# Patient Record
Sex: FEMALE | Race: OTHER
[De-identification: names, ages, dates, MRNs, and addresses within clinical notes are randomized per-mention and may not be internally consistent; named-entity substitution may affect disease eponyms.]

---

## 2019-12-04 ENCOUNTER — HOSPITAL ENCOUNTER (OUTPATIENT)
Dept: HOSPITAL 47 - ORWHC2ENDO | Age: 69
Discharge: HOME | End: 2019-12-04
Attending: INTERNAL MEDICINE
Payer: MEDICARE

## 2019-12-04 VITALS — RESPIRATION RATE: 17 BRPM | HEART RATE: 75 BPM

## 2019-12-04 VITALS — BODY MASS INDEX: 34.7 KG/M2

## 2019-12-04 VITALS — SYSTOLIC BLOOD PRESSURE: 122 MMHG | DIASTOLIC BLOOD PRESSURE: 57 MMHG

## 2019-12-04 VITALS — TEMPERATURE: 97.9 F

## 2019-12-04 DIAGNOSIS — Z98.890: ICD-10-CM

## 2019-12-04 DIAGNOSIS — K57.30: ICD-10-CM

## 2019-12-04 DIAGNOSIS — Z91.040: ICD-10-CM

## 2019-12-04 DIAGNOSIS — M06.9: ICD-10-CM

## 2019-12-04 DIAGNOSIS — K92.1: Primary | ICD-10-CM

## 2019-12-04 DIAGNOSIS — I10: ICD-10-CM

## 2019-12-04 DIAGNOSIS — Z90.710: ICD-10-CM

## 2019-12-04 DIAGNOSIS — Z79.899: ICD-10-CM

## 2019-12-04 DIAGNOSIS — E78.5: ICD-10-CM

## 2019-12-04 DIAGNOSIS — K21.9: ICD-10-CM

## 2019-12-04 PROCEDURE — 45378 DIAGNOSTIC COLONOSCOPY: CPT

## 2019-12-04 NOTE — P.PCN
Date of Procedure: 12/04/19


Procedure(s) Performed: 


BRIEF HISTORY: Patient is a 69-year-old pleasant female scheduled for an 

elective colonoscopy as a part of evaluation of blood in the stool.





PROCEDURE PERFORMED: Colonoscopy. 





PREOPERATIVE DIAGNOSIS: Blood in the stool





IV sedation per Anesthesia. 





PROCEDURE: After informed consent was obtained, the patient, was brought into 

the endoscopy unit. IV sedation was administered by Anesthesia under continuous 

monitoring.  Digital rectal examination was normal. Initially the Olympus CF-160

flexible video colonoscope was then inserted in the rectum, gradually advanced 

into the cecum without any difficulty. Careful examination was performed as the 

scope was gradually being withdrawn. Ileocecal valve and the appendiceal orifice

were visualized and appeared normal.  Prep was excellent. Mucosa of the cecum, 

ascending colon, transverse colon, descending colon, sigmoid colon, and rectum 

appeared normal.  Moderate right-sided diverticulosis seen.  Retroflexion was 

performed in the rectum and no lesions were seen. The patient tolerated the 

procedure well. 





IMPRESSION: 


Normal-appearing colon from rectum to cecum with no evidence of colorectal 

neoplasia.


Moderate left-sided diverticulosis





RECOMMENDATIONS:  Findings of this examination were discussed with the patient 

as well as her family.  She was advised to have a repeat screening colonoscopy 

in 10 years.